# Patient Record
Sex: MALE | Race: BLACK OR AFRICAN AMERICAN | NOT HISPANIC OR LATINO | Employment: STUDENT | URBAN - METROPOLITAN AREA
[De-identification: names, ages, dates, MRNs, and addresses within clinical notes are randomized per-mention and may not be internally consistent; named-entity substitution may affect disease eponyms.]

---
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
## 2024-02-02 ENCOUNTER — HOSPITAL ENCOUNTER (EMERGENCY)
Facility: CLINIC | Age: 19
Discharge: HOME | End: 2024-02-02
Attending: PHYSICIAN ASSISTANT | Admitting: PHYSICIAN ASSISTANT
Payer: COMMERCIAL

## 2024-02-02 VITALS
HEIGHT: 71 IN | WEIGHT: 160 LBS | DIASTOLIC BLOOD PRESSURE: 66 MMHG | OXYGEN SATURATION: 99 % | TEMPERATURE: 97.3 F | BODY MASS INDEX: 22.4 KG/M2 | HEART RATE: 75 BPM | SYSTOLIC BLOOD PRESSURE: 127 MMHG | RESPIRATION RATE: 16 BRPM

## 2024-02-02 DIAGNOSIS — T78.40XA ALLERGIC REACTION, INITIAL ENCOUNTER: ICD-10-CM

## 2024-02-02 PROCEDURE — 99283 EMERGENCY DEPT VISIT LOW MDM: CPT

## 2024-02-02 PROCEDURE — 250N000009 HC RX 250: Performed by: PHYSICIAN ASSISTANT

## 2024-02-02 PROCEDURE — 250N000013 HC RX MED GY IP 250 OP 250 PS 637: Performed by: EMERGENCY MEDICINE

## 2024-02-02 RX ORDER — DEXAMETHASONE SODIUM PHOSPHATE 10 MG/ML
10 INJECTION, SOLUTION INTRAMUSCULAR; INTRAVENOUS ONCE
Status: COMPLETED | OUTPATIENT
Start: 2024-02-02 | End: 2024-02-02

## 2024-02-02 RX ORDER — DIPHENHYDRAMINE HYDROCHLORIDE 50 MG/ML
50 INJECTION INTRAMUSCULAR; INTRAVENOUS ONCE
Status: COMPLETED | OUTPATIENT
Start: 2024-02-02 | End: 2024-02-02

## 2024-02-02 RX ORDER — DIPHENHYDRAMINE HCL 25 MG
25 CAPSULE ORAL ONCE
Status: COMPLETED | OUTPATIENT
Start: 2024-02-02 | End: 2024-02-02

## 2024-02-02 RX ADMIN — DIPHENHYDRAMINE HYDROCHLORIDE 25 MG: 25 CAPSULE ORAL at 19:49

## 2024-02-02 RX ADMIN — DEXAMETHASONE SODIUM PHOSPHATE 10 MG: 10 INJECTION INTRAMUSCULAR; INTRAVENOUS at 20:26

## 2024-02-02 ASSESSMENT — ACTIVITIES OF DAILY LIVING (ADL): ADLS_ACUITY_SCORE: 35

## 2024-02-03 NOTE — ED PROVIDER NOTES
"    History     Chief Complaint:  Allergic Reaction       HPI   Yayo Moreira is a 18 year old male with history of peanut allergy presents for concern of allergic reaction.  The patient ate [employer] chicken around 6:30 PM this evening (100 minutes ago).  He was not aware of this contained peanuts and noted he developed a scratchy throat and some shortness of breath afterwards.  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  He also received 25 mg more in the lobby.    He notes that this time his symptoms are feeling much better after the Benadryl.  He denies any muffled voice shortness of breath throat swelling vomiting dizziness or rash.  He does not have any history of anaphylactic reactions or need for epinephrine and does not carry an EpiPen.      Independent Historian:    none    Review of External Notes:  Reviewed Dr. Duarte allergist note from 5/19/2022 note patient had low positive IgE test to peanut recently at 0.46.  Skin prick size was however significant.  He was recommended to continue avoiding peanuts with plan for future oral food challenge.  Not currently prescribed an EpiPen.    Medications:    No current outpatient medications on file.      Past Medical History:    Past Medical History:   Diagnosis Date    Asthma     NO ACTIVE PROBLEMS     Tachycardia        Past Surgical History:    Past Surgical History:   Procedure Laterality Date    NO HISTORY OF SURGERY            Physical Exam   Patient Vitals for the past 24 hrs:   BP Temp Temp src Pulse Resp SpO2 Height Weight   02/02/24 2048 -- -- -- -- -- 99 % -- --   02/02/24 2033 -- -- -- -- -- 97 % -- --   02/02/24 1946 127/66 97.3  F (36.3  C) Oral 75 16 99 % 1.803 m (5' 11\") 72.6 kg (160 lb)        Physical Exam  General: Awake, alert, non-toxic.  Head:  Scalp is NC/AT  Eyes:  Conjunctiva normal, PERRL  ENT:  The external nose and ears are normal.     Oropharynx clear, uvula midline.  No swelling of lips tongue " face uvula palate or oropharynx.  Phonating clearly and handling secretions.  Neck:  Normal range of motion without rigidity.  CV:  Regular rate and rhythm    No pathologic murmur, rubs, or gallops.  Resp:  Breath sounds are clear bilaterally no wheezing or stridor    Non-labored, no retractions or accessory muscle use  Abdomen: Abdomen is soft, no distension, no tenderness, no masses..  MS:  No lower extremity edema/swelling.  Skin:  Warm and dry, No rash or lesions noted.  Neuro:  Alert and oriented.  GCS 15 Moves all extremities normal.  No facial asymmetry. Gait normal.  Psych: Awake. Alert. Normal affect. Appropriate interactions.      Emergency Department Course       Emergency Department Course & Assessments:  Interventions:  Medications   diphenhydrAMINE (BENADRYL) capsule 25 mg (25 mg Oral $Given 2/2/24 1949)     Or   diphenhydrAMINE (BENADRYL) injection 50 mg ( Intramuscular See Alternative 2/2/24 1949)     Or   diphenhydrAMINE (BENADRYL) injection 50 mg ( Intravenous See Alternative 2/2/24 1949)   dexAMETHasone (PF) (DECADRON) injectable solution used ORALLY 10 mg (10 mg Oral $Given 2/2/24 2026)        Assessments:  2008 I performed an eval of the pt as above.  Re-checked the patient.  Symptoms completely resolved prior to discharge.  Independent Interpretation (X-rays, CTs, rhythm strip):  None    Consultations/Discussion of Management or Tests:  None       Social Determinants of Health affecting care:  Medication noncompliance and note multiple no-show visits with allergy Dr. Duarte.     Disposition:  The patient was discharged to home.     Impression & Plan    CMS Diagnoses: none    Medical Decision Making:  The patient presents for signs and symptoms consistent with a mild allergic reaction.  By time of my eval no clinical evidence of ongoing allergic reaction/anapylaxis following benadryl.  No indication for epinephrine and has not required this previously with peanuts either.      Given decadron for  short while here and monitored w/subjective sx continuing to resolve.  No indication for admission or prolonged observation as did not receive epinephrine at any pt and sx resolved, several hours have now passed since ingestion. At the time of discharge, the patient does not have signs of airway compromise and is hemodynamically stable.  Instructions for the use of benadryl and/or zyrtec, H2 blocker, and prednisone were reviewed.  Return precautions including oral swelling, difficulties breathing, chest pain, syncope were given.  Patient will follow up in approximately 1 week for failure to improve. Epi pen given.      Critical Care time:  was 0 minutes for this patient excluding procedures.    Diagnosis:    ICD-10-CM    1. Allergic reaction, initial encounter  T78.40XA            Discharge Medications:  There are no discharge medications for this patient.       2/2/2024   Shaq Cavanaugh, *              Shaq Cavanaugh PA-C  02/03/24 1100

## 2024-02-03 NOTE — ED TRIAGE NOTES
Pt with hx of allergies to peanuts reports exposure at 1830 - pt took 25mg benadryl po prior to arrival. Pt reports throat discomfort - denies sob, speaking in clear, complete sentences.     Triage Assessment (Adult)       Row Name 02/02/24 1942          Respiratory WDL    Respiratory WDL WDL        Cardiac WDL    Cardiac WDL WDL        Cognitive/Neuro/Behavioral WDL    Cognitive/Neuro/Behavioral WDL WDL

## 2024-09-16 ENCOUNTER — OFFICE VISIT (OUTPATIENT)
Dept: FAMILY MEDICINE | Facility: CLINIC | Age: 19
End: 2024-09-16
Payer: COMMERCIAL

## 2024-09-16 VITALS
SYSTOLIC BLOOD PRESSURE: 119 MMHG | BODY MASS INDEX: 24.3 KG/M2 | HEIGHT: 71 IN | TEMPERATURE: 98.2 F | WEIGHT: 173.6 LBS | RESPIRATION RATE: 16 BRPM | OXYGEN SATURATION: 100 % | HEART RATE: 69 BPM | DIASTOLIC BLOOD PRESSURE: 79 MMHG

## 2024-09-16 DIAGNOSIS — I47.10 SVT (SUPRAVENTRICULAR TACHYCARDIA) (H): ICD-10-CM

## 2024-09-16 DIAGNOSIS — Z13.1 SCREENING FOR DIABETES MELLITUS: ICD-10-CM

## 2024-09-16 DIAGNOSIS — Z00.00 ROUTINE GENERAL MEDICAL EXAMINATION AT A HEALTH CARE FACILITY: Primary | ICD-10-CM

## 2024-09-16 LAB
FASTING STATUS PATIENT QL REPORTED: YES
GLUCOSE SERPL-MCNC: 80 MG/DL (ref 70–99)
HBA1C MFR BLD: 4.4 % (ref 0–5.6)

## 2024-09-16 PROCEDURE — 99395 PREV VISIT EST AGE 18-39: CPT | Mod: 25 | Performed by: PEDIATRICS

## 2024-09-16 PROCEDURE — 90471 IMMUNIZATION ADMIN: CPT | Performed by: PEDIATRICS

## 2024-09-16 PROCEDURE — 90656 IIV3 VACC NO PRSV 0.5 ML IM: CPT | Performed by: PEDIATRICS

## 2024-09-16 PROCEDURE — 82947 ASSAY GLUCOSE BLOOD QUANT: CPT | Performed by: PEDIATRICS

## 2024-09-16 PROCEDURE — 36415 COLL VENOUS BLD VENIPUNCTURE: CPT | Performed by: PEDIATRICS

## 2024-09-16 PROCEDURE — 83036 HEMOGLOBIN GLYCOSYLATED A1C: CPT | Performed by: PEDIATRICS

## 2024-09-16 NOTE — PATIENT INSTRUCTIONS
Patient Education   Preventive Care Advice   This is general advice given by our system to help you stay healthy. However, your care team may have specific advice just for you. Please talk to your care team about your preventive care needs.  Nutrition  Eat 5 or more servings of fruits and vegetables each day.  Try wheat bread, brown rice and whole grain pasta (instead of white bread, rice, and pasta).  Get enough calcium and vitamin D. Check the label on foods and aim for 100% of the RDA (recommended daily allowance).  Lifestyle  Exercise at least 150 minutes each week  (30 minutes a day, 5 days a week).  Do muscle strengthening activities 2 days a week. These help control your weight and prevent disease.  No smoking.  Wear sunscreen to prevent skin cancer.  Have a dental exam and cleaning every 6 months.  Yearly exams  See your health care team every year to talk about:  Any changes in your health.  Any medicines your care team has prescribed.  Preventive care, family planning, and ways to prevent chronic diseases.  Shots (vaccines)   HPV shots (up to age 26), if you've never had them before.  Hepatitis B shots (up to age 59), if you've never had them before.  COVID-19 shot: Get this shot when it's due.  Flu shot: Get a flu shot every year.  Tetanus shot: Get a tetanus shot every 10 years.  Pneumococcal, hepatitis A, and RSV shots: Ask your care team if you need these based on your risk.  Shingles shot (for age 50 and up)  General health tests  Diabetes screening:  Starting at age 35, Get screened for diabetes at least every 3 years.  If you are younger than age 35, ask your care team if you should be screened for diabetes.  Cholesterol test: At age 39, start having a cholesterol test every 5 years, or more often if advised.  Bone density scan (DEXA): At age 50, ask your care team if you should have this scan for osteoporosis (brittle bones).  Hepatitis C: Get tested at least once in your life.  STIs (sexually  transmitted infections)  Before age 24: Ask your care team if you should be screened for STIs.  After age 24: Get screened for STIs if you're at risk. You are at risk for STIs (including HIV) if:  You are sexually active with more than one person.  You don't use condoms every time.  You or a partner was diagnosed with a sexually transmitted infection.  If you are at risk for HIV, ask about PrEP medicine to prevent HIV.  Get tested for HIV at least once in your life, whether you are at risk for HIV or not.  Cancer screening tests  Cervical cancer screening: If you have a cervix, begin getting regular cervical cancer screening tests starting at age 21.  Breast cancer scan (mammogram): If you've ever had breasts, begin having regular mammograms starting at age 40. This is a scan to check for breast cancer.  Colon cancer screening: It is important to start screening for colon cancer at age 45.  Have a colonoscopy test every 10 years (or more often if you're at risk) Or, ask your provider about stool tests like a FIT test every year or Cologuard test every 3 years.  To learn more about your testing options, visit:   .  For help making a decision, visit:   https://bit.ly/zh62258.  Prostate cancer screening test: If you have a prostate, ask your care team if a prostate cancer screening test (PSA) at age 55 is right for you.  Lung cancer screening: If you are a current or former smoker ages 50 to 80, ask your care team if ongoing lung cancer screenings are right for you.  For informational purposes only. Not to replace the advice of your health care provider. Copyright   2023 [employer]. All rights reserved. Clinically reviewed by the [facility] Transitions Program. [employer] [id number] - REV 01/24.

## 2024-09-16 NOTE — PROGRESS NOTES
Preventive Care Visit  [facility]  Anita Santiago MD, Pediatrics  Sep 16, 2024      Assessment & Plan     Routine general medical examination at a health care facility      SVT (supraventricular tachycardia) (H24)  Well controlled, one brief event months ago    Screening for diabetes mellitus  Patient would like to check his sugar level due to recent weight gain  - Glucose; Future  - Hemoglobin A1c; Future            Counseling  Appropriate preventive services were addressed with this patient via screening, questionnaire, or discussion as appropriate for fall prevention, nutrition, physical activity, Tobacco-use cessation, social engagement, weight loss and cognition.  Checklist reviewing preventive services available has been given to the patient.  Reviewed patient's diet, addressing concerns and/or questions.           Ekta Zee is a 18 year old, presenting for the following:  Physical        9/16/2024    10:59 AM   Additional Questions   Roomed by Faith   Accompanied by self         9/16/2024    10:59 AM   Patient Reported Additional Medications   Patient reports taking the following new medications No        Health Care Directive  Patient does not have a Health Care Directive or Living Will: not discussed    History of Present Illness       Reason for visit:  Yearly Check Up                 9/16/2024   General Health   How would you rate your overall physical health? Good   Feel stress (tense, anxious, or unable to sleep) Not at all            9/16/2024   Nutrition   Three or more servings of calcium each day? Yes   Diet: Regular (no restrictions)   How many servings of fruit and vegetables per day? (!) 0-1   How many sweetened beverages each day? 0-1            9/16/2024   Exercise   Days per week of moderate/strenous exercise 4 days   Average minutes spent exercising at this level 60 min            9/16/2024   Social Factors   Frequency of gathering with friends or  "relatives Twice a week   Worry food won't last until get money to buy more No   Food not last or not have enough money for food? No   Do you have housing? (Housing is defined as stable permanent housing and does not include staying ouside in a car, in a tent, in an abandoned building, in an overnight shelter, or couch-surfing.) Yes   Are you worried about losing your housing? No   Lack of transportation? No   Unable to get utilities (heat,electricity)? No            9/16/2024   Dental   Dentist two times every year? Yes            9/8/2024   TB Screening   Were you born outside of the [place]? No              Today's PHQ-2 Score:       9/12/2024    11:21 PM   PHQ-2 ( 1999 [employer])   Q1: Little interest or pleasure in doing things 0   Q2: Feeling down, depressed or hopeless 0   PHQ-2 Score 0   Q1: Little interest or pleasure in doing things Not at all   Q2: Feeling down, depressed or hopeless Not at all   PHQ-2 Score 0         9/16/2024   Substance Use   Alcohol more than 3/day or more than 7/wk No   Do you use any other substances recreationally? No        Social History     Tobacco Use    Smoking status: Never     Passive exposure: Never    Smokeless tobacco: Never   Vaping Use    Vaping status: Never Used   Substance Use Topics    Alcohol use: No    Drug use: No           9/16/2024   STI Screening   New sexual partner(s) since last STI/HIV test? No            9/16/2024   Contraception/Family Planning   Questions about contraception or family planning No           Reviewed and updated as needed this visit by Provider                          Review of Systems  Constitutional, HEENT, cardiovascular, pulmonary, gi and gu systems are negative, except as otherwise noted.     Objective    Exam  /79 (BP Location: Left arm, Patient Position: Sitting, Cuff Size: Adult Large)   Pulse 69   Temp 98.2  F (36.8  C) (Temporal)   Resp 16   Ht 1.803 m (5' 11\")   Wt 78.7 kg (173 lb 9.6 oz)   SpO2 100%   BMI 24.21 kg/m   " "  Estimated body mass index is 24.21 kg/m  as calculated from the following:    Height as of this encounter: 1.803 m (5' 11\").    Weight as of this encounter: 78.7 kg (173 lb 9.6 oz).    Physical Exam  GENERAL: alert and no distress  EYES: Eyes grossly normal to inspection, PERRL and conjunctivae and sclerae normal  HENT: ear canals and TM's normal, nose and mouth without ulcers or lesions  NECK: no adenopathy, no asymmetry, masses, or scars  RESP: lungs clear to auscultation - no rales, rhonchi or wheezes  CV: regular rate and rhythm, normal S1 S2, no S3 or S4, no murmur, click or rub, no peripheral edema  ABDOMEN: soft, nontender, no hepatosplenomegaly, no masses and bowel sounds normal  MS: no gross musculoskeletal defects noted, no edema  : Exam declined by parent/patient. Reason for decline: Patient/Parental preference        Signed Electronically by: Anita Santiago MD    "

## 2024-09-16 NOTE — RESULT ENCOUNTER NOTE
Dear Yayo Moreira,    Your sugar level is normal.       Please don't hesitate to call me or send a message if you have any questions.    Sincerely,  Anita Santiago M.D.  [phone]